# Patient Record
Sex: FEMALE | Race: ASIAN | NOT HISPANIC OR LATINO
[De-identification: names, ages, dates, MRNs, and addresses within clinical notes are randomized per-mention and may not be internally consistent; named-entity substitution may affect disease eponyms.]

---

## 2018-06-14 ENCOUNTER — APPOINTMENT (OUTPATIENT)
Dept: CARDIOLOGY | Facility: CLINIC | Age: 73
End: 2018-06-14
Payer: MEDICARE

## 2018-06-14 VITALS
RESPIRATION RATE: 17 BRPM | DIASTOLIC BLOOD PRESSURE: 96 MMHG | WEIGHT: 158 LBS | OXYGEN SATURATION: 97 % | SYSTOLIC BLOOD PRESSURE: 161 MMHG | HEART RATE: 78 BPM | BODY MASS INDEX: 31.85 KG/M2 | TEMPERATURE: 98.4 F

## 2018-06-14 DIAGNOSIS — E78.5 HYPERLIPIDEMIA, UNSPECIFIED: ICD-10-CM

## 2018-06-14 DIAGNOSIS — I45.10 UNSPECIFIED RIGHT BUNDLE-BRANCH BLOCK: ICD-10-CM

## 2018-06-14 DIAGNOSIS — I10 ESSENTIAL (PRIMARY) HYPERTENSION: ICD-10-CM

## 2018-06-14 PROCEDURE — 93000 ELECTROCARDIOGRAM COMPLETE: CPT

## 2018-06-14 PROCEDURE — 99214 OFFICE O/P EST MOD 30 MIN: CPT

## 2018-06-14 RX ORDER — PEN NEEDLE, DIABETIC 32GX 5/32"
70 NEEDLE, DISPOSABLE MISCELLANEOUS
Qty: 100 | Refills: 0 | Status: ACTIVE | COMMUNITY
Start: 2018-03-24

## 2018-06-14 RX ORDER — CLOTRIMAZOLE 10 MG/ML
1 SOLUTION TOPICAL
Qty: 30 | Refills: 0 | Status: ACTIVE | COMMUNITY
Start: 2018-05-14

## 2018-06-14 RX ORDER — KETOCONAZOLE 20 MG/G
2 CREAM TOPICAL
Qty: 60 | Refills: 0 | Status: ACTIVE | COMMUNITY
Start: 2018-05-14

## 2018-06-14 RX ORDER — NYSTATIN 100000 1/G
100000 POWDER TOPICAL
Qty: 90 | Refills: 0 | Status: ACTIVE | COMMUNITY
Start: 2018-06-06

## 2018-06-14 RX ORDER — DEXLANSOPRAZOLE 60 MG/1
60 CAPSULE, DELAYED RELEASE ORAL
Qty: 30 | Refills: 0 | Status: ACTIVE | COMMUNITY
Start: 2018-05-29

## 2018-06-14 RX ORDER — CICLOPIROX OLAMINE 7.7 MG/ML
0.77 SUSPENSION TOPICAL
Qty: 30 | Refills: 0 | Status: ACTIVE | COMMUNITY
Start: 2018-06-06

## 2018-06-14 RX ORDER — ISOPROPYL ALCOHOL 0.7 ML/ML
70 SWAB TOPICAL
Qty: 100 | Refills: 0 | Status: ACTIVE | COMMUNITY
Start: 2018-05-04

## 2018-06-14 RX ORDER — DICLOFENAC SODIUM 10 MG/G
1 GEL TOPICAL
Qty: 100 | Refills: 0 | Status: ACTIVE | COMMUNITY
Start: 2018-05-14

## 2018-06-14 RX ORDER — OMEGA-3-ACID ETHYL ESTERS CAPSULES 1 G/1
1 CAPSULE, LIQUID FILLED ORAL
Qty: 120 | Refills: 0 | Status: ACTIVE | COMMUNITY
Start: 2018-05-05

## 2021-06-17 ENCOUNTER — APPOINTMENT (OUTPATIENT)
Dept: CARDIOLOGY | Facility: CLINIC | Age: 76
End: 2021-06-17
Payer: MEDICARE

## 2021-06-17 VITALS
DIASTOLIC BLOOD PRESSURE: 88 MMHG | TEMPERATURE: 97.7 F | WEIGHT: 159 LBS | RESPIRATION RATE: 17 BRPM | HEART RATE: 107 BPM | SYSTOLIC BLOOD PRESSURE: 158 MMHG | OXYGEN SATURATION: 97 % | BODY MASS INDEX: 32.05 KG/M2

## 2021-06-17 PROCEDURE — 93000 ELECTROCARDIOGRAM COMPLETE: CPT

## 2021-06-17 PROCEDURE — 99214 OFFICE O/P EST MOD 30 MIN: CPT

## 2021-06-17 PROCEDURE — 99072 ADDL SUPL MATRL&STAF TM PHE: CPT

## 2021-06-17 RX ORDER — IRBESARTAN AND HYDROCHLOROTHIAZIDE 150; 12.5 MG/1; MG/1
150-12.5 TABLET ORAL
Refills: 0 | Status: ACTIVE | COMMUNITY
Start: 2021-06-17

## 2021-06-17 NOTE — PHYSICAL EXAM
[Well Developed] : well developed [Well Nourished] : well nourished [No Acute Distress] : no acute distress [Normal Conjunctiva] : normal conjunctiva [Normal Venous Pressure] : normal venous pressure [No Carotid Bruit] : no carotid bruit [Normal S1, S2] : normal S1, S2 [No Rub] : no rub [No Gallop] : no gallop [Clear Lung Fields] : clear lung fields [Good Air Entry] : good air entry [No Respiratory Distress] : no respiratory distress  [Soft] : abdomen soft [Non Tender] : non-tender [No Masses/organomegaly] : no masses/organomegaly [Normal Bowel Sounds] : normal bowel sounds [Normal Gait] : normal gait [No Edema] : no edema [No Cyanosis] : no cyanosis [No Clubbing] : no clubbing [No Varicosities] : no varicosities [No Rash] : no rash [Moves all extremities] : moves all extremities [No Skin Lesions] : no skin lesions [No Focal Deficits] : no focal deficits [Normal Speech] : normal speech [Alert and Oriented] : alert and oriented [Normal memory] : normal memory [de-identified] : 2/ 6 MARVA PERALTA

## 2021-06-17 NOTE — DISCUSSION/SUMMARY
[FreeTextEntry1] : 75 F HTN hyperlipidemia RBBB DM with CP and SOB.\par EKG for CP.\par CHECK ECHO.\par CHECK NST TO ASSESS PERFUSION (limited ET, hx of CVA).\par Continue medications for HTN and hyperlipidemia.

## 2021-06-17 NOTE — HISTORY OF PRESENT ILLNESS
[FreeTextEntry1] :  \par 1. HTN: on medications, controlled. No SE noted.\par 2. Hyperlipidemia: on statin. The lipid profile is followed by PMD.\par 3. CAD: non-obstructive on cath in 2014.  \par  \par 4. Old CVA: no new deficits, on Aggrenox. \par The patient has not seen me in several months. She reports worsening CP and SOB over the last two months.\par Her symptoms are progressive, severe, relieved with rest and lasting up to one hour. She has severe SOB and has limited ET and uses a walker.\par She received her COVID vaccine.

## 2021-06-17 NOTE — REASON FOR VISIT
[Arrhythmia/ECG Abnorrmalities] : arrhythmia/ECG abnormalities [Hyperlipidemia] : hyperlipidemia [Hypertension] : hypertension [Coronary Artery Disease] : coronary artery disease [FreeTextEntry1] : 75 F HTN hyperlipidemia CAD old CVA for f/u with CP and SOB.\par

## 2021-07-23 ENCOUNTER — APPOINTMENT (OUTPATIENT)
Dept: CARDIOLOGY | Facility: CLINIC | Age: 76
End: 2021-07-23
Payer: MEDICARE

## 2021-07-23 VITALS — DIASTOLIC BLOOD PRESSURE: 84 MMHG | TEMPERATURE: 98.1 F | SYSTOLIC BLOOD PRESSURE: 158 MMHG

## 2021-07-23 DIAGNOSIS — R07.9 CHEST PAIN, UNSPECIFIED: ICD-10-CM

## 2021-07-23 PROCEDURE — 78452 HT MUSCLE IMAGE SPECT MULT: CPT

## 2021-07-23 PROCEDURE — 93015 CV STRESS TEST SUPVJ I&R: CPT

## 2021-07-23 PROCEDURE — 99072 ADDL SUPL MATRL&STAF TM PHE: CPT

## 2021-07-23 PROCEDURE — 93306 TTE W/DOPPLER COMPLETE: CPT

## 2021-07-23 PROCEDURE — A9500: CPT

## 2021-07-29 ENCOUNTER — APPOINTMENT (OUTPATIENT)
Dept: CARDIOLOGY | Facility: CLINIC | Age: 76
End: 2021-07-29
Payer: MEDICARE

## 2021-07-29 VITALS
BODY MASS INDEX: 31.65 KG/M2 | HEART RATE: 93 BPM | RESPIRATION RATE: 17 BRPM | SYSTOLIC BLOOD PRESSURE: 134 MMHG | OXYGEN SATURATION: 97 % | TEMPERATURE: 97.6 F | WEIGHT: 157 LBS | DIASTOLIC BLOOD PRESSURE: 85 MMHG

## 2021-07-29 DIAGNOSIS — I25.10 ATHEROSCLEROTIC HEART DISEASE OF NATIVE CORONARY ARTERY W/OUT ANGINA PECTORIS: ICD-10-CM

## 2021-07-29 PROCEDURE — 93000 ELECTROCARDIOGRAM COMPLETE: CPT

## 2021-07-29 PROCEDURE — 99214 OFFICE O/P EST MOD 30 MIN: CPT

## 2021-07-29 NOTE — HISTORY OF PRESENT ILLNESS
[FreeTextEntry1] :  \par 1. HTN: on medications, compliant with medications.\par 2. Hyperlipidemia: on statin. No SE noted.\par 3. CAD: patient had LAD stenosis of 40% in 2014. She now returns with worsening CP and SOB over the last two months. Her symptoms are progressive, severe, relieved with rest and lasting up to one hour. She has severe SOB and has limited ET and uses a walker.\par She had an echo and NST done. Echo showed normal LVEF and NST showed apical defect.\par She has been on medical treatment but continues to have exertional CP and SOB.\par  \par 4. Old CVA: no new deficits, on Aggrenox. \par  \par She received her COVID vaccine. \par

## 2021-07-29 NOTE — PHYSICAL EXAM
[Well Developed] : well developed [Well Nourished] : well nourished [No Acute Distress] : no acute distress [Normal Conjunctiva] : normal conjunctiva [Normal Venous Pressure] : normal venous pressure [No Carotid Bruit] : no carotid bruit [Normal S1, S2] : normal S1, S2 [No Rub] : no rub [No Gallop] : no gallop [Clear Lung Fields] : clear lung fields [Good Air Entry] : good air entry [No Respiratory Distress] : no respiratory distress  [Soft] : abdomen soft [Non Tender] : non-tender [No Masses/organomegaly] : no masses/organomegaly [Normal Bowel Sounds] : normal bowel sounds [Normal Gait] : normal gait [No Edema] : no edema [No Cyanosis] : no cyanosis [No Clubbing] : no clubbing [No Varicosities] : no varicosities [No Rash] : no rash [No Skin Lesions] : no skin lesions [Moves all extremities] : moves all extremities [No Focal Deficits] : no focal deficits [Normal Speech] : normal speech [Alert and Oriented] : alert and oriented [Normal memory] : normal memory [de-identified] : 2/ 6 MARVA PERALTA

## 2021-07-29 NOTE — DISCUSSION/SUMMARY
[FreeTextEntry1] : 75 F HTN hyperlipidemia CAD RBBB abnormal NST and EKG.\par EKG for CP.\par The patient continues to have exertional CP and SOB despite medical treatment (ASA, BB, statin, NG).\par HER SYMPTOMS ARE CONSISTENT WITH PROGRESSIVE ANGINA.\par REFER FOR CARDIAC CATHETERIZATION STAT.\par Continue medications for HTN and hyperlipidemia.\par Condition deteriorating, see me after tests.

## 2021-08-03 VITALS
SYSTOLIC BLOOD PRESSURE: 120 MMHG | HEART RATE: 77 BPM | TEMPERATURE: 98 F | RESPIRATION RATE: 16 BRPM | DIASTOLIC BLOOD PRESSURE: 60 MMHG | OXYGEN SATURATION: 99 %

## 2021-08-03 RX ORDER — CHLORHEXIDINE GLUCONATE 213 G/1000ML
1 SOLUTION TOPICAL ONCE
Refills: 0 | Status: DISCONTINUED | OUTPATIENT
Start: 2021-08-06 | End: 2021-08-07

## 2021-08-03 RX ORDER — OMEGA-3 ACID ETHYL ESTERS 1 G
1 CAPSULE ORAL
Qty: 0 | Refills: 0 | DISCHARGE

## 2021-08-03 RX ORDER — DEXLANSOPRAZOLE 30 MG/1
1 CAPSULE, DELAYED RELEASE ORAL
Qty: 0 | Refills: 0 | DISCHARGE

## 2021-08-03 NOTE — H&P ADULT - NSICDXPASTMEDICALHX_GEN_ALL_CORE_FT
PAST MEDICAL HISTORY:  Cholelithiasis     CVA (cerebral vascular accident) left weakness, mild 2013    HLD (hyperlipidemia)     HTN (hypertension)     Sleep apnea

## 2021-08-03 NOTE — H&P ADULT - NSICDXFAMILYHX_GEN_ALL_CORE_FT
FAMILY HISTORY:  Mother  Still living? No  Family history of heart disease, Age at diagnosis: Age Unknown    Sibling  Still living? Yes, Estimated age: 65  Family history of stent, Age at diagnosis: Age Unknown  Family history of stent, Age at diagnosis: Age Unknown

## 2021-08-03 NOTE — H&P ADULT - ATTENDING COMMENTS
Please see PA note for full details.  I have reviewed the case, examined the patient on 8/6/21 and agree with plan.  HTN hyperlipidemia old CVA with CP. The patient underwent cardiac catheterization that showed severe pLAD stenosis.  She underwent PCI of the LAD.  Continue ASA and plavix.

## 2021-08-03 NOTE — H&P ADULT - HISTORY OF PRESENT ILLNESS
SKELETON      COVID:      Cardiologist: Dr Corado      Pharmacy: [facility] [address] [facility] [address] [phone]     Escort:     75 Y F with FH CAD (brothers and mother) pmh HTN, HLD, CVA (No deficits, on Aggrenox), CAD (non-obstructive LAD 40% in 2014) who presented to her cardiologist c/o progressively worsening CP and SOB with minimal exertion x 2 months.      Pt denies      NST 7/23/21: medium sized mild defect in anterior and anterolateral walls that are fixed suggestive of attenuation artifact. Myocardial perfusion normal. EF >70%     ECHO 7/23/21: EF 65-70%, mild diastolic dysfunction (stage I), mild MR.      In light of risk factors, CCS angina class ____ symptoms despite normal NST pt recommended for cardiac angiogram with possible intervention if clinically indicated.   SKELETON      COVID:  vaccinated per Dr Corado's note    Cardiologist: Dr Corado      Pharmacy: [facility] [address] [facility] [address] [phone]     Escort:     75 Y F with FH CAD (brothers and mother) pmh HTN, HLD, CVA (No deficits, on Aggrenox), CAD (non-obstructive LAD 40% in 2014) who presented to her cardiologist c/o progressively worsening CP and SOB with minimal exertion x 2 months.      Pt denies      NST 7/23/21: medium sized mild defect in anterior and anterolateral walls that are fixed suggestive of attenuation artifact. Myocardial perfusion normal. EF >70%     ECHO 7/23/21: EF 65-70%, mild diastolic dysfunction (stage I), mild MR.      In light of risk factors, CCS angina class ____ symptoms despite normal NST pt recommended for cardiac angiogram with possible intervention if clinically indicated.   SKELETON      COVID:  vaccinated 1st dose per Dr Corado's note    Cardiologist: Dr Corado      Pharmacy: [facility] [address] [place] [address] [phone]     Escort:     75 Y F with FH CAD (brothers and mother) pmh HTN, HLD, CVA (No deficits, on Aggrenox), CAD (non-obstructive LAD 40% in 2014) who presented to her cardiologist c/o progressively worsening CP and SOB with minimal exertion x 2 months.      Pt denies      NST 7/23/21: medium sized mild defect in anterior and anterolateral walls that are fixed suggestive of attenuation artifact. Myocardial perfusion normal. EF >70%     ECHO 7/23/21: EF 65-70%, mild diastolic dysfunction (stage I), mild MR.      In light of risk factors, CCS angina class ____ symptoms despite normal NST pt recommended for cardiac angiogram with possible intervention if clinically indicated.   SKELETON      COVID:  vaccinated  Cardiologist: Dr Corado      Pharmacy: [facility] [address] [place] [address] [phone]     Escort: daughter Darling    History obtained from daughter (reliable), confirm meds     75 Y F Mongolian speaking with FH CAD (brothers and mother) pmh HTN, HLD, CVA (8 yrs ago, residual L sided weakness and ambulates with walker, on Aggrenox), CAD (non-obstructive LAD 40% in 2014) who presented to her cardiologist c/o progressively worsening CP, SOB and dizziness with walking 1-2 blocks x 2 months.   Pt diaphoresis, palpitations, N/V/D, syncope, orthopnea, PND, LE edema, fever, chills, melena, hematuria, sick contact or recent travel. NST 7/23/21: medium sized mild defect in anterior and anterolateral walls that are fixed suggestive of attenuation artifact. Myocardial perfusion normal. EF >70%.  ECHO 7/23/21: EF 65-70%, mild diastolic dysfunction (stage I), mild MR.      In light of risk factors, CCS angina class III symptoms despite normal NST pt recommended for cardiac angiogram with possible intervention if clinically indicated.     COVID:  vaccinated  Cardiologist: Dr Corado      Pharmacy: [facility] [address] [facility] [address] [phone]     Escort: daughter Darling    History obtained from daughter (reliable), confirm meds     75 Y F Uzbek speaking with FH CAD (brothers and mother) pmh HTN, HLD, CVA (8 yrs ago, residual L sided weakness and ambulates with walker, on Aggrenox), CAD (non-obstructive LAD 40% in 2014) who presented to her cardiologist c/o progressively worsening CP, SOB and dizziness with walking 1-2 blocks x 2 months.   Pt diaphoresis, palpitations, N/V/D, syncope, orthopnea, PND, LE edema, fever, chills, melena, hematuria, sick contact or recent travel. NST 7/23/21: medium sized mild defect in anterior and anterolateral walls that are fixed suggestive of attenuation artifact. Myocardial perfusion normal. EF >70%.  ECHO 7/23/21: EF 65-70%, mild diastolic dysfunction (stage I), mild MR.      In light of risk factors, CCS angina class III symptoms despite normal NST pt recommended for cardiac angiogram with possible intervention if clinically indicated.

## 2021-08-03 NOTE — H&P ADULT - ASSESSMENT
75 Y F Khmer speaking with FH CAD (brothers and mother) pmh HTN, HLD, CVA (8 yrs ago, residual L sided weakness and ambulates with walker, on Aggrenox), CAD (non-obstructive LAD 40% in 2014) who presented to her cardiologist c/o progressively worsening CP, SOB and dizziness with walking 1-2 blocks x 2 months.   Pt diaphoresis, palpitations, N/V/D, syncope, orthopnea, PND, LE edema, fever, chills, melena, hematuria, sick contact or recent travel. NST 7/23/21: medium sized mild defect in anterior and anterolateral walls that are fixed suggestive of attenuation artifact. Myocardial perfusion normal. EF >70%.  ECHO 7/23/21: EF 65-70%, mild diastolic dysfunction (stage I), mild MR.      ASA:   Mallampati:     Cr: Load   EKG:     Patient is suitable candidate for moderate sedation  Risks & benefits of procedure and alternative therapy have been explained to the patient including but not limited to: allergic reaction, bleeding w/possible need for blood transfusion, infection, renal and vascular compromise, limb damage, arrhythmia, stroke, vessel dissection/perforation, Myocardial infarction, emergent CABG. Informed consent obtained and in chart.  75 Y F Maori speaking with FH CAD (brothers and mother) pmh HTN, HLD, CVA (8 yrs ago, residual L sided weakness and ambulates with walker, on Aggrenox), CAD (non-obstructive LAD 40% in 2014) who presented to her cardiologist c/o progressively worsening CP, SOB and dizziness with walking 1-2 blocks x 2 months.   Pt diaphoresis, palpitations, N/V/D, syncope, orthopnea, PND, LE edema, fever, chills, melena, hematuria, sick contact or recent travel. NST 7/23/21: medium sized mild defect in anterior and anterolateral walls that are fixed suggestive of attenuation artifact. Myocardial perfusion normal. EF >70%.  ECHO 7/23/21: EF 65-70%, mild diastolic dysfunction (stage I), mild MR.      ASA: II  Mallampati: II     Cr: 0.67 NS 75 cc/hr  Load 13.1/41 patient has not taken aspirin for 2 days, given ecotrin 162mg and Plavix 600mg x 1.    EKG: NSR RBBB 74bpm, q waves in inferior leads    Patient is suitable candidate for moderate sedation  Risks & benefits of procedure and alternative therapy have been explained to the patient including but not limited to: allergic reaction, bleeding w/possible need for blood transfusion, infection, renal and vascular compromise, limb damage, arrhythmia, stroke, vessel dissection/perforation, Myocardial infarction, emergent CABG. Informed consent obtained and in chart.

## 2021-08-06 ENCOUNTER — TRANSCRIPTION ENCOUNTER (OUTPATIENT)
Age: 76
End: 2021-08-06

## 2021-08-06 ENCOUNTER — INPATIENT (INPATIENT)
Facility: HOSPITAL | Age: 76
LOS: 0 days | Discharge: HOME | DRG: 247 | End: 2021-08-07
Attending: INTERNAL MEDICINE | Admitting: INTERNAL MEDICINE
Payer: MEDICARE

## 2021-08-06 DIAGNOSIS — Z98.1 ARTHRODESIS STATUS: Chronic | ICD-10-CM

## 2021-08-06 DIAGNOSIS — Z98.89 OTHER SPECIFIED POSTPROCEDURAL STATES: Chronic | ICD-10-CM

## 2021-08-06 LAB
A1C WITH ESTIMATED AVERAGE GLUCOSE RESULT: 6.1 % — HIGH (ref 4–5.6)
ALBUMIN SERPL ELPH-MCNC: 4.5 G/DL — SIGNIFICANT CHANGE UP (ref 3.3–5)
ALP SERPL-CCNC: 69 U/L — SIGNIFICANT CHANGE UP (ref 40–120)
ALT FLD-CCNC: 22 U/L — SIGNIFICANT CHANGE UP (ref 10–45)
ANION GAP SERPL CALC-SCNC: 9 MMOL/L — SIGNIFICANT CHANGE UP (ref 5–17)
APTT BLD: 37 SEC — HIGH (ref 27.5–35.5)
AST SERPL-CCNC: 23 U/L — SIGNIFICANT CHANGE UP (ref 10–40)
BASOPHILS # BLD AUTO: 0.04 K/UL — SIGNIFICANT CHANGE UP (ref 0–0.2)
BASOPHILS NFR BLD AUTO: 0.8 % — SIGNIFICANT CHANGE UP (ref 0–2)
BILIRUB SERPL-MCNC: 0.8 MG/DL — SIGNIFICANT CHANGE UP (ref 0.2–1.2)
BUN SERPL-MCNC: 17 MG/DL — SIGNIFICANT CHANGE UP (ref 7–23)
CALCIUM SERPL-MCNC: 9.7 MG/DL — SIGNIFICANT CHANGE UP (ref 8.4–10.5)
CHLORIDE SERPL-SCNC: 106 MMOL/L — SIGNIFICANT CHANGE UP (ref 96–108)
CHOLEST SERPL-MCNC: 250 MG/DL — HIGH
CK MB CFR SERPL CALC: 4 NG/ML — SIGNIFICANT CHANGE UP (ref 0–6.7)
CK SERPL-CCNC: 138 U/L — SIGNIFICANT CHANGE UP (ref 25–170)
CO2 SERPL-SCNC: 25 MMOL/L — SIGNIFICANT CHANGE UP (ref 22–31)
CREAT SERPL-MCNC: 0.67 MG/DL — SIGNIFICANT CHANGE UP (ref 0.5–1.3)
EOSINOPHIL # BLD AUTO: 0.09 K/UL — SIGNIFICANT CHANGE UP (ref 0–0.5)
EOSINOPHIL NFR BLD AUTO: 1.8 % — SIGNIFICANT CHANGE UP (ref 0–6)
ESTIMATED AVERAGE GLUCOSE: 128 MG/DL — HIGH (ref 68–114)
GLUCOSE SERPL-MCNC: 111 MG/DL — HIGH (ref 70–99)
HCT VFR BLD CALC: 41.1 % — SIGNIFICANT CHANGE UP (ref 34.5–45)
HDLC SERPL-MCNC: 52 MG/DL — SIGNIFICANT CHANGE UP
HGB BLD-MCNC: 13.1 G/DL — SIGNIFICANT CHANGE UP (ref 11.5–15.5)
IMM GRANULOCYTES NFR BLD AUTO: 0.2 % — SIGNIFICANT CHANGE UP (ref 0–1.5)
INR BLD: 0.97 — SIGNIFICANT CHANGE UP (ref 0.88–1.16)
LIPID PNL WITH DIRECT LDL SERPL: 147 MG/DL — HIGH
LYMPHOCYTES # BLD AUTO: 1.7 K/UL — SIGNIFICANT CHANGE UP (ref 1–3.3)
LYMPHOCYTES # BLD AUTO: 34.8 % — SIGNIFICANT CHANGE UP (ref 13–44)
MCHC RBC-ENTMCNC: 26.1 PG — LOW (ref 27–34)
MCHC RBC-ENTMCNC: 31.9 GM/DL — LOW (ref 32–36)
MCV RBC AUTO: 82 FL — SIGNIFICANT CHANGE UP (ref 80–100)
MONOCYTES # BLD AUTO: 0.31 K/UL — SIGNIFICANT CHANGE UP (ref 0–0.9)
MONOCYTES NFR BLD AUTO: 6.3 % — SIGNIFICANT CHANGE UP (ref 2–14)
NEUTROPHILS # BLD AUTO: 2.74 K/UL — SIGNIFICANT CHANGE UP (ref 1.8–7.4)
NEUTROPHILS NFR BLD AUTO: 56.1 % — SIGNIFICANT CHANGE UP (ref 43–77)
NON HDL CHOLESTEROL: 198 MG/DL — HIGH
NRBC # BLD: 0 /100 WBCS — SIGNIFICANT CHANGE UP (ref 0–0)
PLATELET # BLD AUTO: 188 K/UL — SIGNIFICANT CHANGE UP (ref 150–400)
POTASSIUM SERPL-MCNC: 4 MMOL/L — SIGNIFICANT CHANGE UP (ref 3.5–5.3)
POTASSIUM SERPL-SCNC: 4 MMOL/L — SIGNIFICANT CHANGE UP (ref 3.5–5.3)
PROT SERPL-MCNC: 7.6 G/DL — SIGNIFICANT CHANGE UP (ref 6–8.3)
PROTHROM AB SERPL-ACNC: 11.6 SEC — SIGNIFICANT CHANGE UP (ref 10.6–13.6)
RBC # BLD: 5.01 M/UL — SIGNIFICANT CHANGE UP (ref 3.8–5.2)
RBC # FLD: 13.7 % — SIGNIFICANT CHANGE UP (ref 10.3–14.5)
SODIUM SERPL-SCNC: 140 MMOL/L — SIGNIFICANT CHANGE UP (ref 135–145)
TRIGL SERPL-MCNC: 254 MG/DL — HIGH
WBC # BLD: 4.89 K/UL — SIGNIFICANT CHANGE UP (ref 3.8–10.5)
WBC # FLD AUTO: 4.89 K/UL — SIGNIFICANT CHANGE UP (ref 3.8–10.5)

## 2021-08-06 PROCEDURE — 99222 1ST HOSP IP/OBS MODERATE 55: CPT

## 2021-08-06 PROCEDURE — 93458 L HRT ARTERY/VENTRICLE ANGIO: CPT | Mod: 26,XU

## 2021-08-06 PROCEDURE — 92978 ENDOLUMINL IVUS OCT C 1ST: CPT | Mod: 26,LD

## 2021-08-06 PROCEDURE — 92928 PRQ TCAT PLMT NTRAC ST 1 LES: CPT | Mod: LD

## 2021-08-06 RX ORDER — ASPIRIN/CALCIUM CARB/MAGNESIUM 324 MG
81 TABLET ORAL DAILY
Refills: 0 | Status: DISCONTINUED | OUTPATIENT
Start: 2021-08-07 | End: 2021-08-07

## 2021-08-06 RX ORDER — ATORVASTATIN CALCIUM 80 MG/1
80 TABLET, FILM COATED ORAL AT BEDTIME
Refills: 0 | Status: DISCONTINUED | OUTPATIENT
Start: 2021-08-06 | End: 2021-08-07

## 2021-08-06 RX ORDER — LOSARTAN POTASSIUM 100 MG/1
50 TABLET, FILM COATED ORAL DAILY
Refills: 0 | Status: DISCONTINUED | OUTPATIENT
Start: 2021-08-07 | End: 2021-08-07

## 2021-08-06 RX ORDER — SODIUM CHLORIDE 9 MG/ML
500 INJECTION INTRAMUSCULAR; INTRAVENOUS; SUBCUTANEOUS
Refills: 0 | Status: DISCONTINUED | OUTPATIENT
Start: 2021-08-06 | End: 2021-08-07

## 2021-08-06 RX ORDER — CLOPIDOGREL BISULFATE 75 MG/1
75 TABLET, FILM COATED ORAL DAILY
Refills: 0 | Status: DISCONTINUED | OUTPATIENT
Start: 2021-08-07 | End: 2021-08-07

## 2021-08-06 RX ORDER — CLOPIDOGREL BISULFATE 75 MG/1
600 TABLET, FILM COATED ORAL ONCE
Refills: 0 | Status: COMPLETED | OUTPATIENT
Start: 2021-08-06 | End: 2021-08-06

## 2021-08-06 RX ORDER — METOPROLOL TARTRATE 50 MG
25 TABLET ORAL DAILY
Refills: 0 | Status: DISCONTINUED | OUTPATIENT
Start: 2021-08-06 | End: 2021-08-07

## 2021-08-06 RX ORDER — SODIUM CHLORIDE 9 MG/ML
500 INJECTION INTRAMUSCULAR; INTRAVENOUS; SUBCUTANEOUS
Refills: 0 | Status: DISCONTINUED | OUTPATIENT
Start: 2021-08-06 | End: 2021-08-06

## 2021-08-06 RX ORDER — ASPIRIN/CALCIUM CARB/MAGNESIUM 324 MG
162 TABLET ORAL ONCE
Refills: 0 | Status: COMPLETED | OUTPATIENT
Start: 2021-08-06 | End: 2021-08-06

## 2021-08-06 RX ORDER — PANTOPRAZOLE SODIUM 20 MG/1
40 TABLET, DELAYED RELEASE ORAL
Refills: 0 | Status: DISCONTINUED | OUTPATIENT
Start: 2021-08-06 | End: 2021-08-07

## 2021-08-06 RX ORDER — HYDROCHLOROTHIAZIDE 25 MG
12.5 TABLET ORAL DAILY
Refills: 0 | Status: DISCONTINUED | OUTPATIENT
Start: 2021-08-07 | End: 2021-08-07

## 2021-08-06 RX ADMIN — SODIUM CHLORIDE 75 MILLILITER(S): 9 INJECTION INTRAMUSCULAR; INTRAVENOUS; SUBCUTANEOUS at 11:17

## 2021-08-06 RX ADMIN — ATORVASTATIN CALCIUM 80 MILLIGRAM(S): 80 TABLET, FILM COATED ORAL at 21:37

## 2021-08-06 RX ADMIN — SODIUM CHLORIDE 75 MILLILITER(S): 9 INJECTION INTRAMUSCULAR; INTRAVENOUS; SUBCUTANEOUS at 15:01

## 2021-08-06 RX ADMIN — Medication 162 MILLIGRAM(S): at 11:17

## 2021-08-06 RX ADMIN — CLOPIDOGREL BISULFATE 600 MILLIGRAM(S): 75 TABLET, FILM COATED ORAL at 11:17

## 2021-08-07 ENCOUNTER — TRANSCRIPTION ENCOUNTER (OUTPATIENT)
Age: 76
End: 2021-08-07

## 2021-08-07 VITALS — TEMPERATURE: 97 F

## 2021-08-07 LAB
ANION GAP SERPL CALC-SCNC: 9 MMOL/L — SIGNIFICANT CHANGE UP (ref 5–17)
BASOPHILS # BLD AUTO: 0.03 K/UL — SIGNIFICANT CHANGE UP (ref 0–0.2)
BASOPHILS NFR BLD AUTO: 0.6 % — SIGNIFICANT CHANGE UP (ref 0–2)
BUN SERPL-MCNC: 12 MG/DL — SIGNIFICANT CHANGE UP (ref 7–23)
CALCIUM SERPL-MCNC: 9.1 MG/DL — SIGNIFICANT CHANGE UP (ref 8.4–10.5)
CHLORIDE SERPL-SCNC: 109 MMOL/L — HIGH (ref 96–108)
CO2 SERPL-SCNC: 24 MMOL/L — SIGNIFICANT CHANGE UP (ref 22–31)
COVID-19 SPIKE DOMAIN AB INTERP: POSITIVE
COVID-19 SPIKE DOMAIN ANTIBODY RESULT: >250 U/ML — HIGH
CREAT SERPL-MCNC: 0.59 MG/DL — SIGNIFICANT CHANGE UP (ref 0.5–1.3)
EOSINOPHIL # BLD AUTO: 0.08 K/UL — SIGNIFICANT CHANGE UP (ref 0–0.5)
EOSINOPHIL NFR BLD AUTO: 1.7 % — SIGNIFICANT CHANGE UP (ref 0–6)
GLUCOSE SERPL-MCNC: 99 MG/DL — SIGNIFICANT CHANGE UP (ref 70–99)
HCT VFR BLD CALC: 41 % — SIGNIFICANT CHANGE UP (ref 34.5–45)
HCV AB S/CO SERPL IA: 0.04 S/CO — SIGNIFICANT CHANGE UP
HCV AB SERPL-IMP: SIGNIFICANT CHANGE UP
HGB BLD-MCNC: 12.9 G/DL — SIGNIFICANT CHANGE UP (ref 11.5–15.5)
IMM GRANULOCYTES NFR BLD AUTO: 0.2 % — SIGNIFICANT CHANGE UP (ref 0–1.5)
LYMPHOCYTES # BLD AUTO: 1.51 K/UL — SIGNIFICANT CHANGE UP (ref 1–3.3)
LYMPHOCYTES # BLD AUTO: 32.2 % — SIGNIFICANT CHANGE UP (ref 13–44)
MAGNESIUM SERPL-MCNC: 2 MG/DL — SIGNIFICANT CHANGE UP (ref 1.6–2.6)
MCHC RBC-ENTMCNC: 26.2 PG — LOW (ref 27–34)
MCHC RBC-ENTMCNC: 31.5 GM/DL — LOW (ref 32–36)
MCV RBC AUTO: 83.3 FL — SIGNIFICANT CHANGE UP (ref 80–100)
MONOCYTES # BLD AUTO: 0.28 K/UL — SIGNIFICANT CHANGE UP (ref 0–0.9)
MONOCYTES NFR BLD AUTO: 6 % — SIGNIFICANT CHANGE UP (ref 2–14)
NEUTROPHILS # BLD AUTO: 2.78 K/UL — SIGNIFICANT CHANGE UP (ref 1.8–7.4)
NEUTROPHILS NFR BLD AUTO: 59.3 % — SIGNIFICANT CHANGE UP (ref 43–77)
NRBC # BLD: 0 /100 WBCS — SIGNIFICANT CHANGE UP (ref 0–0)
PLATELET # BLD AUTO: 170 K/UL — SIGNIFICANT CHANGE UP (ref 150–400)
POTASSIUM SERPL-MCNC: 3.8 MMOL/L — SIGNIFICANT CHANGE UP (ref 3.5–5.3)
POTASSIUM SERPL-SCNC: 3.8 MMOL/L — SIGNIFICANT CHANGE UP (ref 3.5–5.3)
RBC # BLD: 4.92 M/UL — SIGNIFICANT CHANGE UP (ref 3.8–5.2)
RBC # FLD: 13.6 % — SIGNIFICANT CHANGE UP (ref 10.3–14.5)
SARS-COV-2 IGG+IGM SERPL QL IA: >250 U/ML — HIGH
SARS-COV-2 IGG+IGM SERPL QL IA: POSITIVE
SODIUM SERPL-SCNC: 142 MMOL/L — SIGNIFICANT CHANGE UP (ref 135–145)
WBC # BLD: 4.69 K/UL — SIGNIFICANT CHANGE UP (ref 3.8–10.5)
WBC # FLD AUTO: 4.69 K/UL — SIGNIFICANT CHANGE UP (ref 3.8–10.5)

## 2021-08-07 PROCEDURE — 86769 SARS-COV-2 COVID-19 ANTIBODY: CPT

## 2021-08-07 PROCEDURE — C1753: CPT

## 2021-08-07 PROCEDURE — 82550 ASSAY OF CK (CPK): CPT

## 2021-08-07 PROCEDURE — 85025 COMPLETE CBC W/AUTO DIFF WBC: CPT

## 2021-08-07 PROCEDURE — C1894: CPT

## 2021-08-07 PROCEDURE — 83036 HEMOGLOBIN GLYCOSYLATED A1C: CPT

## 2021-08-07 PROCEDURE — 80048 BASIC METABOLIC PNL TOTAL CA: CPT

## 2021-08-07 PROCEDURE — C1887: CPT

## 2021-08-07 PROCEDURE — C1874: CPT

## 2021-08-07 PROCEDURE — 82553 CREATINE MB FRACTION: CPT

## 2021-08-07 PROCEDURE — 36415 COLL VENOUS BLD VENIPUNCTURE: CPT

## 2021-08-07 PROCEDURE — 99232 SBSQ HOSP IP/OBS MODERATE 35: CPT

## 2021-08-07 PROCEDURE — C1769: CPT

## 2021-08-07 PROCEDURE — 80053 COMPREHEN METABOLIC PANEL: CPT

## 2021-08-07 PROCEDURE — 83735 ASSAY OF MAGNESIUM: CPT

## 2021-08-07 PROCEDURE — 80061 LIPID PANEL: CPT

## 2021-08-07 PROCEDURE — 85610 PROTHROMBIN TIME: CPT

## 2021-08-07 PROCEDURE — 86803 HEPATITIS C AB TEST: CPT

## 2021-08-07 PROCEDURE — 85730 THROMBOPLASTIN TIME PARTIAL: CPT

## 2021-08-07 RX ORDER — IRBESARTAN AND HYDROCHLOROTHIAZIDE 12.5; 3 MG/1; MG/1
1 TABLET ORAL
Qty: 30 | Refills: 3
Start: 2021-08-07 | End: 2021-12-04

## 2021-08-07 RX ORDER — EZETIMIBE 10 MG/1
1 TABLET ORAL
Qty: 30 | Refills: 3
Start: 2021-08-07 | End: 2021-12-04

## 2021-08-07 RX ORDER — CLOPIDOGREL BISULFATE 75 MG/1
1 TABLET, FILM COATED ORAL
Qty: 30 | Refills: 11
Start: 2021-08-07 | End: 2022-08-01

## 2021-08-07 RX ORDER — ASPIRIN/CALCIUM CARB/MAGNESIUM 324 MG
1 TABLET ORAL
Qty: 0 | Refills: 0 | DISCHARGE

## 2021-08-07 RX ORDER — ASPIRIN/CALCIUM CARB/MAGNESIUM 324 MG
1 TABLET ORAL
Qty: 30 | Refills: 11
Start: 2021-08-07 | End: 2022-08-01

## 2021-08-07 RX ORDER — ATORVASTATIN CALCIUM 80 MG/1
1 TABLET, FILM COATED ORAL
Qty: 0 | Refills: 0 | DISCHARGE

## 2021-08-07 RX ORDER — METOPROLOL TARTRATE 50 MG
1 TABLET ORAL
Qty: 0 | Refills: 0 | DISCHARGE

## 2021-08-07 RX ORDER — POTASSIUM CHLORIDE 20 MEQ
20 PACKET (EA) ORAL ONCE
Refills: 0 | Status: COMPLETED | OUTPATIENT
Start: 2021-08-07 | End: 2021-08-07

## 2021-08-07 RX ORDER — IRBESARTAN AND HYDROCHLOROTHIAZIDE 12.5; 3 MG/1; MG/1
1 TABLET ORAL
Qty: 0 | Refills: 0 | DISCHARGE

## 2021-08-07 RX ORDER — EZETIMIBE 10 MG/1
1 TABLET ORAL
Qty: 0 | Refills: 0 | DISCHARGE

## 2021-08-07 RX ORDER — ATORVASTATIN CALCIUM 80 MG/1
1 TABLET, FILM COATED ORAL
Qty: 30 | Refills: 3
Start: 2021-08-07 | End: 2021-12-04

## 2021-08-07 RX ORDER — CLOPIDOGREL BISULFATE 75 MG/1
1 TABLET, FILM COATED ORAL
Qty: 0 | Refills: 0 | DISCHARGE
Start: 2021-08-07

## 2021-08-07 RX ORDER — METOPROLOL TARTRATE 50 MG
1 TABLET ORAL
Qty: 30 | Refills: 3
Start: 2021-08-07 | End: 2021-12-04

## 2021-08-07 RX ADMIN — CLOPIDOGREL BISULFATE 75 MILLIGRAM(S): 75 TABLET, FILM COATED ORAL at 10:23

## 2021-08-07 RX ADMIN — Medication 25 MILLIGRAM(S): at 05:35

## 2021-08-07 RX ADMIN — PANTOPRAZOLE SODIUM 40 MILLIGRAM(S): 20 TABLET, DELAYED RELEASE ORAL at 05:35

## 2021-08-07 RX ADMIN — Medication 81 MILLIGRAM(S): at 10:22

## 2021-08-07 RX ADMIN — Medication 20 MILLIEQUIVALENT(S): at 10:22

## 2021-08-07 NOTE — DISCHARGE NOTE PROVIDER - NSDCFUADDINST_GEN_ALL_CORE_FT
***You underwent a coronary angiogram and should wait 3 days before returning to ordinary activities.   ***The catheter from your wrist was removed and you should remove the dressing in 24 hours. ***You may shower once the dressing is removed, but avoid baths, hot tubs, or swimming for 5 days to prevent infection.   ***If you notice bleeding from the site, hardening and pain at the site, drainage or redness from the site, coolness/paleness of the extremity, swelling, or fever, please call [phone].

## 2021-08-07 NOTE — DISCHARGE NOTE PROVIDER - HOSPITAL COURSE
75 Y F Yoruba speaking with FH CAD (brothers and mother) pmh HTN, HLD, CVA (8 yrs ago, residual L sided weakness and ambulates with walker, on Aggrenox), CAD (non-obstructive LAD 40% in 2014) who presented to her cardiologist c/o progressively worsening CP, SOB and dizziness with walking 1-2 blocks x 2 months.   Pt diaphoresis, palpitations, N/V/D, syncope, orthopnea, PND, LE edema, fever, chills, melena, hematuria, sick contact or recent travel. NST 7/23/21: medium sized mild defect in anterior and anterolateral walls that are fixed suggestive of attenuation artifact. Myocardial perfusion normal. EF >70%.  ECHO 7/23/21: EF 65-70%, mild diastolic dysfunction (stage I), mild MR.  In light of risk factors, CCS angina class III symptoms despite normal NST pt recommended for cardiac angiogram. Patient now s/p cardiac cath 8/6/21 with successful STEPHANIA x 1 mLAD (70-80%), EF 65-70% by ECHO. Patient admitted to [facility] for monitoring post procedure. Patient currently asymptomatic, VSS, right radial access site soft, NT, no hematoma, radial pulse 2+. Labs/telemetry reviewed. - increased home Atorvastatin 40mg to 80mg daily. Patient deemed stable for discharge as per Dr. Hill and to follow-up with Dr. Corado in 1-2 weeks. All need prescriptions have been e-prescribed to patients preferred outpatient pharmacy.     Cardiac Rehab (STEMI/NSTEMI/ACS/Unstable Angina/CHF/Chronic Stable Angina/Heart Surgery (CABG, Valve)/Post PCI):            *Education on benefits of Cardiac Rehab provided to patient: Yes/No         *Referral and Prescription Given for Cardiac Rehab : Yes/No.  If No, Why Not?         *Pt given list of locations & instructed to contact their insurance company to review list of participating providers

## 2021-08-07 NOTE — DISCHARGE NOTE NURSING/CASE MANAGEMENT/SOCIAL WORK - PATIENT PORTAL LINK FT
You can access the FollowMyHealth Patient Portal offered by [facility] by registering at the following website: [email or web address]. By joining [facility]’s FollowMyHealth portal, you will also be able to view your health information using other applications (apps) compatible with our system.

## 2021-08-07 NOTE — DISCHARGE NOTE PROVIDER - CARE PROVIDER_API CALL
Chuckie Corado)  Cardiovascular Disease; Internal Medicine  [address] [place] [address]  Phone: [phone]  Fax: [phone]  Follow Up Time: 2 weeks

## 2021-08-07 NOTE — DISCHARGE NOTE PROVIDER - NSDCCPCAREPLAN_GEN_ALL_CORE_FT
PRINCIPAL DISCHARGE DIAGNOSIS  Diagnosis: CAD (coronary artery disease)  Assessment and Plan of Treatment: You underwent successful percutaneous coronary intervention with drug eluding stent placement in your mid left anterior descending artery.  --Continue Aspirin 81mg by mouth daily and Plavix 75mg by mouth daily.  --DO NOT STOP TAKING ASPIRIN OR PLAVIX UNLESS INSTRUCTED BY YOUR CARDIOLOGIST TO PREVENT STENT CLOSURE/HEART ATTACK.   ***We have provided you with a prescription for cardiac rehab which is medically supervised exercise program for your heart and has been shown to improve the quantity and quality of life of people with heart disease like yours.   ***You should attend cardiac rehab 3 times per week for 12 weeks. We have provided you with a list of nearby facilities.   ***Please call your insurance carrier to determine which of these facilities are covered under your plan.   ***Please bring this prescription with you to your follow up appointment with your cardiologist who can then further assist you to enroll into a cardiac rehab program.      SECONDARY DISCHARGE DIAGNOSES  Diagnosis: Hypertension  Assessment and Plan of Treatment: Continue home medication: Irbesartan HCTZ 150/12.5mg by mouth daily and Toprol XL 25mg by mouth daily.    Diagnosis: Hyperlipidemia  Assessment and Plan of Treatment: Continue home medication: Zetia 10mg by mouth daily, Omega 3 1000mg by mouth daily and increased dose of Atorvastatin 80mg by mouth at bedtime.

## 2021-08-07 NOTE — DISCHARGE NOTE PROVIDER - NSDCMRMEDTOKEN_GEN_ALL_CORE_FT
Aspirin Enteric Coated 81 mg oral delayed release tablet: 1 tab(s) orally once a day  atorvastatin 40 mg oral tablet: 1 tab(s) orally once a day  Dexilant 60 mg oral delayed release capsule: 1 cap(s) orally once a day  irbesartan-hydrochlorothiazide 150mg-12.5mg oral tablet: 1 tab(s) orally once a day  Omega-3 1000 mg oral capsule: 1 cap(s) orally once a day  see medication reconciliation form:     Toprol-XL 25 mg oral tablet, extended release: 1 tab(s) orally once a day  Zetia 10 mg oral tablet: 1 tab(s) orally once a day   Aspirin Enteric Coated 81 mg oral delayed release tablet: 1 tab(s) orally once a day  atorvastatin 40 mg oral tablet: 1 tab(s) orally once a day  Cardiac Rehab : Dx CAD s/p PCI    Cardiac Rehab 3x a week for 12 weeks   clopidogrel 75 mg oral tablet: 1 tab(s) orally once a day  Dexilant 60 mg oral delayed release capsule: 1 cap(s) orally once a day  irbesartan-hydrochlorothiazide 150mg-12.5mg oral tablet: 1 tab(s) orally once a day  Omega-3 1000 mg oral capsule: 1 cap(s) orally once a day  Toprol-XL 25 mg oral tablet, extended release: 1 tab(s) orally once a day  Zetia 10 mg oral tablet: 1 tab(s) orally once a day   Aspirin Enteric Coated 81 mg oral delayed release tablet: 1 tab(s) orally once a day  atorvastatin 80 mg oral tablet: 1 tab(s) orally once a day (at bedtime)   Cardiac Rehab : Dx CAD s/p PCI    Cardiac Rehab 3x a week for 12 weeks   clopidogrel 75 mg oral tablet: 1 tab(s) orally once a day  Dexilant 60 mg oral delayed release capsule: 1 cap(s) orally once a day  irbesartan-hydrochlorothiazide 150mg-12.5mg oral tablet: 1 tab(s) orally once a day  Omega-3 1000 mg oral capsule: 1 cap(s) orally once a day  Toprol-XL 25 mg oral tablet, extended release: 1 tab(s) orally once a day  Zetia 10 mg oral tablet: 1 tab(s) orally once a day

## 2021-08-12 DIAGNOSIS — I25.110 ATHEROSCLEROTIC HEART DISEASE OF NATIVE CORONARY ARTERY WITH UNSTABLE ANGINA PECTORIS: ICD-10-CM

## 2021-08-12 DIAGNOSIS — I10 ESSENTIAL (PRIMARY) HYPERTENSION: ICD-10-CM

## 2021-08-12 DIAGNOSIS — I45.10 UNSPECIFIED RIGHT BUNDLE-BRANCH BLOCK: ICD-10-CM

## 2021-08-12 DIAGNOSIS — Z79.82 LONG TERM (CURRENT) USE OF ASPIRIN: ICD-10-CM

## 2021-08-12 DIAGNOSIS — E78.5 HYPERLIPIDEMIA, UNSPECIFIED: ICD-10-CM

## 2021-08-12 DIAGNOSIS — I25.10 ATHEROSCLEROTIC HEART DISEASE OF NATIVE CORONARY ARTERY WITHOUT ANGINA PECTORIS: ICD-10-CM

## 2021-08-12 DIAGNOSIS — Z98.1 ARTHRODESIS STATUS: ICD-10-CM

## 2021-08-12 DIAGNOSIS — I69.354 HEMIPLEGIA AND HEMIPARESIS FOLLOWING CEREBRAL INFARCTION AFFECTING LEFT NON-DOMINANT SIDE: ICD-10-CM

## 2021-08-12 DIAGNOSIS — G47.30 SLEEP APNEA, UNSPECIFIED: ICD-10-CM

## 2021-08-16 ENCOUNTER — APPOINTMENT (OUTPATIENT)
Dept: CARDIOLOGY | Facility: CLINIC | Age: 76
End: 2021-08-16
Payer: MEDICARE

## 2021-08-16 VITALS
HEART RATE: 99 BPM | BODY MASS INDEX: 31.85 KG/M2 | OXYGEN SATURATION: 98 % | RESPIRATION RATE: 17 BRPM | TEMPERATURE: 98 F | DIASTOLIC BLOOD PRESSURE: 77 MMHG | SYSTOLIC BLOOD PRESSURE: 133 MMHG | WEIGHT: 158 LBS

## 2021-08-16 DIAGNOSIS — Z98.61 CORONARY ANGIOPLASTY STATUS: ICD-10-CM

## 2021-08-16 PROCEDURE — 99495 TRANSJ CARE MGMT MOD F2F 14D: CPT

## 2021-08-16 PROCEDURE — 93000 ELECTROCARDIOGRAM COMPLETE: CPT

## 2021-08-16 NOTE — REASON FOR VISIT
[Arrhythmia/ECG Abnorrmalities] : arrhythmia/ECG abnormalities [Hyperlipidemia] : hyperlipidemia [Hypertension] : hypertension [Coronary Artery Disease] : coronary artery disease [FreeTextEntry1] : 75 F HTN hyperlipidemia CAD old CVA for f/u after PCI.\par

## 2021-08-16 NOTE — HISTORY OF PRESENT ILLNESS
[FreeTextEntry1] :  \par 1. HTN: on medications, controlled.\par 2. Hyperlipidemia: on statin. No muscle pain is noted.\par 3. CAD: patient had LAD stenosis of 40% in 2014. \par The patient underwent cardiac catheterization due to recurrent CP and SOB. She was found to have severe mLAD stenosis (70-80%) and underwent PCI. She was discharged on 8/7/21. She reports improvement in CP and SOB.\par She is on ASA and plavix.\par  \par 4. Old CVA: no new deficits, on Aggrenox. \par  \par She received her COVID vaccine. \par

## 2021-08-16 NOTE — PHYSICAL EXAM
[Well Developed] : well developed [Well Nourished] : well nourished [No Acute Distress] : no acute distress [Normal Conjunctiva] : normal conjunctiva [Normal Venous Pressure] : normal venous pressure [No Carotid Bruit] : no carotid bruit [Normal S1, S2] : normal S1, S2 [No Rub] : no rub [No Gallop] : no gallop [Clear Lung Fields] : clear lung fields [Good Air Entry] : good air entry [No Respiratory Distress] : no respiratory distress  [Soft] : abdomen soft [Non Tender] : non-tender [No Masses/organomegaly] : no masses/organomegaly [Normal Bowel Sounds] : normal bowel sounds [Normal Gait] : normal gait [No Edema] : no edema [No Cyanosis] : no cyanosis [No Clubbing] : no clubbing [No Varicosities] : no varicosities [No Rash] : no rash [No Skin Lesions] : no skin lesions [Moves all extremities] : moves all extremities [No Focal Deficits] : no focal deficits [Normal Speech] : normal speech [Alert and Oriented] : alert and oriented [Normal memory] : normal memory [de-identified] : 2/ 6 MARVA PERALTA

## 2021-09-20 ENCOUNTER — APPOINTMENT (OUTPATIENT)
Dept: CARDIOLOGY | Facility: CLINIC | Age: 76
End: 2021-09-20
Payer: MEDICARE

## 2021-09-20 VITALS
OXYGEN SATURATION: 97 % | TEMPERATURE: 97.6 F | BODY MASS INDEX: 32.65 KG/M2 | DIASTOLIC BLOOD PRESSURE: 88 MMHG | SYSTOLIC BLOOD PRESSURE: 159 MMHG | RESPIRATION RATE: 17 BRPM | HEART RATE: 76 BPM | WEIGHT: 162 LBS

## 2021-09-20 PROCEDURE — 93000 ELECTROCARDIOGRAM COMPLETE: CPT

## 2021-09-20 PROCEDURE — 99214 OFFICE O/P EST MOD 30 MIN: CPT

## 2021-09-20 NOTE — PHYSICAL EXAM
[Well Developed] : well developed [Well Nourished] : well nourished [No Acute Distress] : no acute distress [Normal Conjunctiva] : normal conjunctiva [Normal Venous Pressure] : normal venous pressure [No Carotid Bruit] : no carotid bruit [Normal S1, S2] : normal S1, S2 [No Gallop] : no gallop [No Rub] : no rub [Clear Lung Fields] : clear lung fields [Good Air Entry] : good air entry [No Respiratory Distress] : no respiratory distress  [Soft] : abdomen soft [Non Tender] : non-tender [No Masses/organomegaly] : no masses/organomegaly [Normal Bowel Sounds] : normal bowel sounds [Normal Gait] : normal gait [No Edema] : no edema [No Cyanosis] : no cyanosis [No Clubbing] : no clubbing [No Varicosities] : no varicosities [No Rash] : no rash [No Skin Lesions] : no skin lesions [Moves all extremities] : moves all extremities [No Focal Deficits] : no focal deficits [Normal Speech] : normal speech [Alert and Oriented] : alert and oriented [Normal memory] : normal memory [de-identified] : 2/ 6 MARVA PERALTA

## 2021-09-20 NOTE — DISCUSSION/SUMMARY
[FreeTextEntry1] : 75 F HTN hyperlipidemia CAD RBBB for f/u after PCI.\par Continue ASA 81 and plavix 75 for CAD s/p PCI.\par Continue medications for HTN.\par Continue statin for hyperlipidemia.\par Patient received her COVID vaccine.\par

## 2021-09-20 NOTE — HISTORY OF PRESENT ILLNESS
[FreeTextEntry1] :   \par 1. HTN: on medications, no SE noted.\par 2. Hyperlipidemia: on statin. The lipid profile is followed by PMD.\par 3. CAD: patient had LAD stenosis of 40% in 2014. \par The patient underwent cardiac catheterization due to recurrent CP and SOB. She was found to have severe mLAD stenosis (70-80%) and underwent PCI. She was discharged on 8/7/21. She reports improvement in CP and SOB.\par She is on ASA and plavix.\par  \par 4. Old CVA: no new deficits, on Aggrenox. \par  \par She received her COVID vaccine. \par \par The patient denies CP or SOB. No cough or fevers noted.\par \par ET is limited and patient uses a walker.\par

## 2021-09-20 NOTE — REASON FOR VISIT
[Hyperlipidemia] : hyperlipidemia [Hypertension] : hypertension [Coronary Artery Disease] : coronary artery disease [FreeTextEntry1] : 75 F HTN hyperlipidemia CAD old CVA for f/u after PCI.\par \par

## 2021-11-29 ENCOUNTER — APPOINTMENT (OUTPATIENT)
Dept: CARDIOLOGY | Facility: CLINIC | Age: 76
End: 2021-11-29
Payer: MEDICARE

## 2021-11-29 VITALS
TEMPERATURE: 97.7 F | HEART RATE: 86 BPM | SYSTOLIC BLOOD PRESSURE: 161 MMHG | OXYGEN SATURATION: 94 % | WEIGHT: 164 LBS | BODY MASS INDEX: 33.06 KG/M2 | DIASTOLIC BLOOD PRESSURE: 82 MMHG | RESPIRATION RATE: 17 BRPM

## 2021-11-29 PROCEDURE — 93000 ELECTROCARDIOGRAM COMPLETE: CPT

## 2021-11-29 PROCEDURE — 99214 OFFICE O/P EST MOD 30 MIN: CPT

## 2021-11-29 RX ORDER — IRBESARTAN 150 MG/1
150 TABLET ORAL
Qty: 30 | Refills: 0 | Status: ACTIVE | COMMUNITY
Start: 2021-07-13

## 2021-11-29 RX ORDER — ATORVASTATIN CALCIUM 80 MG/1
80 TABLET, FILM COATED ORAL
Qty: 30 | Refills: 0 | Status: ACTIVE | COMMUNITY
Start: 2021-11-01

## 2021-11-29 RX ORDER — CELECOXIB 200 MG/1
200 CAPSULE ORAL
Qty: 30 | Refills: 0 | Status: ACTIVE | COMMUNITY
Start: 2021-11-01

## 2021-11-29 RX ORDER — ALENDRONATE SODIUM 70 MG/1
70 TABLET ORAL
Qty: 4 | Refills: 0 | Status: ACTIVE | COMMUNITY
Start: 2021-11-01

## 2021-11-29 RX ORDER — BLOOD SUGAR DIAGNOSTIC
STRIP MISCELLANEOUS
Qty: 100 | Refills: 0 | Status: ACTIVE | COMMUNITY
Start: 2021-08-13

## 2021-11-29 RX ORDER — LANCETS 30 GAUGE
EACH MISCELLANEOUS
Qty: 100 | Refills: 0 | Status: ACTIVE | COMMUNITY
Start: 2021-08-13

## 2021-11-29 RX ORDER — DICLOFENAC EPOLAMINE 0.01 G/1
1.3 SYSTEM TOPICAL
Qty: 60 | Refills: 0 | Status: ACTIVE | COMMUNITY
Start: 2021-11-01

## 2021-11-29 RX ORDER — ICOSAPENT ETHYL 1000 MG/1
1 CAPSULE ORAL
Qty: 60 | Refills: 0 | Status: ACTIVE | COMMUNITY
Start: 2021-11-01

## 2021-11-29 NOTE — REASON FOR VISIT
[Hyperlipidemia] : hyperlipidemia [Hypertension] : hypertension [Coronary Artery Disease] : coronary artery disease [FreeTextEntry1] : 75 F HTN hyperlipidemia CAD old CVA for f/u after PCI.\par

## 2021-11-29 NOTE — HISTORY OF PRESENT ILLNESS
[FreeTextEntry1] :  \par 1. HTN: on medications, controlled.\par 2. Hyperlipidemia: on statin. No muscle pain is noted.\par 3. CAD: patient had LAD stenosis of 40% in 2014. \par The patient underwent cardiac catheterization due to recurrent CP and SOB. She was found to have severe mLAD stenosis (70-80%) and underwent PCI. She was discharged on 8/7/21. She reports improvement in CP and SOB.\par She is on ASA and plavix.\par  \par 4. Old CVA: no new deficits, on Aggrenox. \par  \par She received her COVID vaccine. \par \par She denies any new symptoms. She uses a walker.

## 2021-11-29 NOTE — PHYSICAL EXAM
[Well Developed] : well developed [Well Nourished] : well nourished [No Acute Distress] : no acute distress [Normal Conjunctiva] : normal conjunctiva [Normal Venous Pressure] : normal venous pressure [No Carotid Bruit] : no carotid bruit [Normal S1, S2] : normal S1, S2 [No Rub] : no rub [No Gallop] : no gallop [Clear Lung Fields] : clear lung fields [Good Air Entry] : good air entry [No Respiratory Distress] : no respiratory distress  [Soft] : abdomen soft [Non Tender] : non-tender [No Masses/organomegaly] : no masses/organomegaly [Normal Bowel Sounds] : normal bowel sounds [Normal Gait] : normal gait [No Edema] : no edema [No Cyanosis] : no cyanosis [No Clubbing] : no clubbing [No Varicosities] : no varicosities [No Rash] : no rash [No Skin Lesions] : no skin lesions [Moves all extremities] : moves all extremities [No Focal Deficits] : no focal deficits [Normal Speech] : normal speech [Alert and Oriented] : alert and oriented [Normal memory] : normal memory [de-identified] : 2/ 6 MARVA PERALTA

## 2021-11-29 NOTE — DISCUSSION/SUMMARY
[FreeTextEntry1] : 75 F HTN hyperlipidemia CAD RBBB for f/u after PCI.\par Continue ASA 81 and plavix 75 for CAD s/p PCI.\par Continue medications for HTN.\par Continue statin for hyperlipidemia.\par Monitor RBBB.\par Symptom monitoring and social distancing.\par

## 2022-01-01 NOTE — REVIEW OF SYSTEMS
[Dyspnea on exertion] : dyspnea during exertion [SOB] : shortness of breath [Chest Discomfort] : chest discomfort [Negative] : Heme/Lymph No

## 2022-01-10 ENCOUNTER — APPOINTMENT (OUTPATIENT)
Dept: CARDIOLOGY | Facility: CLINIC | Age: 77
End: 2022-01-10

## 2022-07-19 ENCOUNTER — APPOINTMENT (OUTPATIENT)
Dept: HEART AND VASCULAR | Facility: CLINIC | Age: 77
End: 2022-07-19

## 2022-07-19 PROCEDURE — 99442: CPT

## 2022-07-19 NOTE — HISTORY OF PRESENT ILLNESS
[Home] : at home, [unfilled] , at the time of the visit. [Medical Office: ([facility])___] : at the medical office located in  [Verbal consent obtained from patient] : the patient, [unfilled] [FreeTextEntry1] :  \par 1. HTN: on medications, compliant with medications.\par 2. Hyperlipidemia: on statin. No SE are noted. \par 3. CAD: patient had LAD stenosis of 40% in 2014. \par The patient underwent cardiac catheterization due to recurrent CP and SOB. She was found to have severe mLAD stenosis (70-80%) and underwent PCI. She was discharged on 8/7/21. She reports improvement in CP and SOB.\par She is on ASA and plavix.\par  \par 4. Old CVA: no new deficits, on Aggrenox. \par  \par She received her COVID vaccine. \par \par She has had intermittent dizziness after a recent discharge from the hospital. She was also in rehab for about one week. [Time Spent: ___ minutes] : I have spent [unfilled] minutes with the patient on the telephone

## 2022-07-19 NOTE — PLAN
[FreeTextEntry1] : 1. Continue ASA and plavix.\par 2. Patient to come in for office evaluation if symptoms persist.

## 2022-07-26 RX ORDER — CLOPIDOGREL BISULFATE 75 MG/1
75 TABLET, FILM COATED ORAL DAILY
Qty: 30 | Refills: 5 | Status: ACTIVE | COMMUNITY
Start: 2021-08-11 | End: 1900-01-01

## 2023-03-21 NOTE — DISCHARGE NOTE PROVIDER - CARE PROVIDERS DIRECT ADDRESSES
4
[email or web address]

## 2023-07-13 NOTE — H&P ADULT - AS BP NONINV METHOD

## 2024-06-11 NOTE — DISCHARGE NOTE NURSING/CASE MANAGEMENT/SOCIAL WORK - DATE OF LAST VACCINATION
06-Feb-2021 Patient ID: Lexi Worrell 

## 2024-08-14 ENCOUNTER — APPOINTMENT (OUTPATIENT)
Dept: OPHTHALMOLOGY | Facility: CLINIC | Age: 79
End: 2024-08-14

## 2025-06-10 NOTE — DISCUSSION/SUMMARY
[FreeTextEntry1] : 75 F HTN hyperlipidemia CAD RBBB for f/u after PCI.\par Continue medications for HTN.\par Continue statin for hyperlipidemia.\par Continue ASA and plavis for CAD s/p PCI.\par EKG for CAD s/p PCI.\par  \par Patient seen within 14 days of discharge.\par  [FreeTextEntry1] : ROSALIND HODGE  is a 77 year old  F  Hx CAD, PAF, Afl s/p RFA, CMP/hfp CM, VHD s/p MVR, VT/VF s/p ICD, hypertension, tob use/COPD, upper GI bleed/PUD, CRI, PVD, hyperlipidemia with statin intol.  Previously presented to [facility] with shortness of breath, dizziness, diaphoresis, elevated heart rate >200, hypotension. Cardioverted for WCT: VT versus atrial fibrillation with rapid ventricular rate/BBB. Underwent cardiac cath and subsequent bivICD Then HF and mitral valve replacement with #29 tissue valve 2019  Recurrent AF, afl s/p MARITZA DCCV 3/20, early recurrence RFA of Afl 6/20 ep rx amio  follows with Dr. Mcdaniels d/t CKD intol [id number] 2/2 uti/ri  underwent right heart cath with CardioMEMS and carotid angiography underwent peripheral revascularization December 2021  She does stationary bike at home.  No chest pain, pressure, palpitations, unusual shortness of breath, orthopnea or syncope. There are no bleeding issues.    PAD Goal 14.  off MRA per her nephrologist there was reported hyperkalemia Intol lasix due to sx of int dizziness. Lasix then switched back to bumex which she responds better to.   Device:  VT/ATP 1/9/24 was asymptomatic, seen by EP since. No change to medications.  Now S/P gen change 6/4/24 remotely transmitting.   LFTs 6/10/24 borderline elevated  July 2024 hg 13.4, k 4.1, cr 1.6 baseline, LDL 52, trig 166,  AST 64/ALT 75 At this time amiodarone was stopped  LDL was 249 off rx On Prava 20mg + Leqvio LDL 52  EKG VPACED Echo 6/2024 EF 40s mitral valve no evidence of regurg or stenosis,  mild AS, LVH carotid angio  occluded right innominate artery with no significant left-sided disease, followed annually by vascular Cardiac catheterization 2022 moderate diagonal disease, occluded right coronary artery  Has seen multiple specialists. Doing home ex bike no exertional symptoms.  She was taken off amio d/t elevated LFTs. Then #s increased. She has had colonoscopy was told there is a "kink" in her intestines but plan is to monitor d/t asx and CEA was wnl. She sees Dr. Cole.  Saw vascular discussed of RLE angio/intervention. She reports her LE discomfort is nonlimiting at this time. This was deferred.  Statin + Leqvio were on hold for a period She has also seen PCP who started her on Rybelsus for DMII. She declined any injectable therapy including insulin She reports improving her diet since dx. She has lost weight since last seen. Reports her sugars have improved.  Does not consume tylenol/etoh Last visit with GI reviewed discussed possibly trial off plavix d/t concern for drug induced steatohepatitis.  However as per #s reflected below have improved. She restarted Leqvio 4/16/25. Next 7/2025.   PAD 3/10/25 was 7mmHg (goal is 14) There was severe L gr toe gout exacerbation for a month. She is unable to walk independently without walker. She saw podiatrist only minor improvement with steroid injection, not on oral gout therapy.  She was asked to hold her entresto and bumex given LANE on labs. Her nephro is Dr Mcdaniels.   Interim her gout was successfully treated by podiatrist. She is able to walk and her pain has resolved. She is hydrating. Followup labs 3/14/25 K 3.8, cr 1.65 She is back on entresto and taking bumex 1mg M-W-F as directed by HF team. She had increased weight yesterday with CALI. PAD was 17, took extra bumex, and today PAD 13, sx/weight improved.   EKG at last visit was Vpaced with underlying AFL. She saw EP as appears she went back into persistent AF on 2/15 with controlled rates (coincides with gout attack), and there is discussion of AF ablation. She is asx for AF.  There continues to be ~56% burden of PAF with rates in 70s when in AF.  Her echo shows LVEF 40-45% unchanged, reduced RV function, bioprosthetic MV in position with mild MR, mild AS low flow low gradient, PASP 25mmHg.  There is plan for AF ablation 7/24/25  Lab 5/23/25 hg 14.8,  k 4.8, cr 1.25, AST /ALT 43/39, trig 197, LDL 97, a1c 6.3, BNP 1173   labs 3/14/25 K 3.8, cr 1.65, BNP 1701 Lab 3/7/25 creat 3 !, K 3.1, AST 46, ALT 40, trigs 235,  Lab 2/11/25 ALT 58, AST 44 2/3/25 k 4.7, cr 1.28, a1c 9.3, Ca 9.7, glucose 152, microal/creat ratio 233mg/g Lab 12/2024 , ,  Lab 10/31/24 , , k 4.6, cr 1.4, elevated Ca level pt reports known by nephro elevated glucose 346